# Patient Record
Sex: FEMALE | Race: WHITE
[De-identification: names, ages, dates, MRNs, and addresses within clinical notes are randomized per-mention and may not be internally consistent; named-entity substitution may affect disease eponyms.]

---
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
## 2025-06-24 NOTE — NUR
14 Y/O FEMALE C/O DIZZINESS + LIGHTHEADEDNESS STARTING YESTERDAY, PT REPORTS 4 
EPISODES OF DIARRHEA YESTERDAY AND ONE EPISODE TODAY. PATIENT IS NOW FEELING 
SLIGHTLY WEAK. PATIENT DENIES ANY ABD PAIN AT THIS TIME. DENIES ANY EMESIS 
EPISODES. NO ABD BLOATING NOTED AT THIS TIME. PT ALSO STATES TODAY SHE FELT A 
TINGLING SENSATION IN HER MOUTH. DKA